# Patient Record
Sex: FEMALE | Race: WHITE | NOT HISPANIC OR LATINO | Employment: STUDENT | URBAN - METROPOLITAN AREA
[De-identification: names, ages, dates, MRNs, and addresses within clinical notes are randomized per-mention and may not be internally consistent; named-entity substitution may affect disease eponyms.]

---
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
## 2018-08-01 ENCOUNTER — TELEPHONE (OUTPATIENT)
Dept: NEUROLOGY | Facility: CLINIC | Age: 18
End: 2018-08-01

## 2018-08-01 NOTE — TELEPHONE ENCOUNTER
I returned patients mothers phone call as requested  difficult to understand her  States that PCP sent referral 2 days ago for Neurology, I explained nothing in the system as of yet  She said they sent 2x already  I requested PCP #, she gave me daughters Nephorlogist Dr Moran Comment?? [phone]  I searched through chart and cannot fine any referral or consult for Neurology, ED note of 7/4/18 states pt  Seen for syncope    [place] [facility] no f/up instructions on d/c summary  I did call Dr [facility] office and spoke to his nurse, Jacobo Cruz  She stated that they did fax over 2x  I explained that it does go through [employer]  I will wait  Another day or two and check again  If still not in system, I will call and give another fax# to use  Sofía Del Rio agreeable

## 2018-08-02 ENCOUNTER — TRANSCRIBE ORDERS (OUTPATIENT)
Dept: NEUROLOGY | Facility: CLINIC | Age: 18
End: 2018-08-02

## 2018-08-02 ENCOUNTER — TELEPHONE (OUTPATIENT)
Dept: NEUROLOGY | Facility: CLINIC | Age: 18
End: 2018-08-02

## 2018-08-02 DIAGNOSIS — Z87.898 HX OF SYNCOPE: Primary | ICD-10-CM

## 2018-08-02 NOTE — TELEPHONE ENCOUNTER
LM[facility] letting patient know we receive order for Syncope  Please schedule an appointment with Dr Jackelyn White for 09/19/18 for 12:00pm if patient is interested   Or transfer call

## 2019-04-12 ENCOUNTER — TELEPHONE (OUTPATIENT)
Dept: NON INVASIVE DIAGNOSTICS | Facility: HOSPITAL | Age: 19
End: 2019-04-12

## 2019-05-15 ENCOUNTER — HOSPITAL ENCOUNTER (OUTPATIENT)
Dept: NON INVASIVE DIAGNOSTICS | Facility: HOSPITAL | Age: 19
Discharge: HOME | End: 2019-05-15
Payer: COMMERCIAL

## 2019-05-15 DIAGNOSIS — R55 SYNCOPE, UNSPECIFIED SYNCOPE TYPE: ICD-10-CM

## 2019-05-15 PROCEDURE — 93660 TILT TABLE EVALUATION: CPT

## 2019-06-02 PROCEDURE — 93660 TILT TABLE EVALUATION: CPT | Performed by: INTERNAL MEDICINE

## 2019-07-04 ENCOUNTER — HOSPITAL ENCOUNTER (EMERGENCY)
Facility: HOSPITAL | Age: 19
Discharge: HOME | End: 2019-07-04
Attending: EMERGENCY MEDICINE
Payer: COMMERCIAL

## 2019-07-04 VITALS
DIASTOLIC BLOOD PRESSURE: 54 MMHG | TEMPERATURE: 98.3 F | HEIGHT: 62 IN | OXYGEN SATURATION: 98 % | HEART RATE: 83 BPM | BODY MASS INDEX: 28.93 KG/M2 | RESPIRATION RATE: 16 BRPM | WEIGHT: 157.19 LBS | SYSTOLIC BLOOD PRESSURE: 115 MMHG

## 2019-07-04 DIAGNOSIS — Z76.0 MEDICATION REFILL: Primary | ICD-10-CM

## 2019-07-04 LAB
EXT PREG TEST URINE: NEGATIVE
EXT. CONTROL ED NAV: NORMAL

## 2019-07-04 PROCEDURE — 81025 URINE PREGNANCY TEST: CPT | Performed by: EMERGENCY MEDICINE

## 2019-07-04 PROCEDURE — 99283 EMERGENCY DEPT VISIT LOW MDM: CPT | Performed by: EMERGENCY MEDICINE

## 2019-07-04 PROCEDURE — 99282 EMERGENCY DEPT VISIT SF MDM: CPT

## 2019-07-04 RX ORDER — LEVONORGESTREL AND ETHINYL ESTRADIOL 0.1-0.02MG
1 KIT ORAL DAILY
Qty: 28 TABLET | Refills: 0 | Status: SHIPPED | OUTPATIENT
Start: 2019-07-04

## 2019-07-04 RX ORDER — LEVONORGESTREL AND ETHINYL ESTRADIOL 0.1-0.02MG
1 KIT ORAL DAILY
COMMUNITY
End: 2019-08-05

## 2019-07-04 NOTE — ED PROVIDER NOTES
History  Chief Complaint   Patient presents with    Medication Refill     pt presents for refill of birth control sronyx      HPI  47-year-old female with history of anxiety and OCD presents to the emergency department requesting medication refill  Patient states that she finished her last pack of birth control, Sronyx, on 6/30  Medication was previously prescribed by medical staff at her school, but she recently graduated  She does plan on seeing her PCP later this month and will request PCP to prescribe her birth control  Prior to Admission Medications   Prescriptions Last Dose Informant Patient Reported? Taking?   levonorgestrel-ethinyl estradiol (AVIANE,ALESSE,LESSINA) 0 1-20 MG-MCG per tablet   Yes Yes   Sig: Take 1 tablet by mouth daily      Facility-Administered Medications: None       Past Medical History:   Diagnosis Date    Anxiety     OCD (obsessive compulsive disorder)     Psychiatric disorder        History reviewed  No pertinent surgical history  History reviewed  No pertinent family history  I have reviewed and agree with the history as documented  Social History     Tobacco Use    Smoking status: Never Smoker   Substance Use Topics    Alcohol use: No    Drug use: No        Review of Systems   All other systems reviewed and are negative  Physical Exam  Physical Exam   Constitutional: She is oriented to person, place, and time  She appears well-nourished  No distress  HENT:   Head: Normocephalic and atraumatic  Eyes: EOM are normal    Neck: Normal range of motion  Neck supple  Cardiovascular: Normal rate and regular rhythm  Pulmonary/Chest: No respiratory distress  Abdominal: She exhibits no distension  Musculoskeletal: Normal range of motion  Neurological: She is alert and oriented to person, place, and time  Skin: Skin is warm and dry  She is not diaphoretic  Psychiatric: She has a normal mood and affect   Her behavior is normal    Nursing note and vitals reviewed  Vital Signs  ED Triage Vitals   Temperature Pulse Respirations Blood Pressure SpO2   07/04/19 1147 07/04/19 1145 07/04/19 1145 07/04/19 1147 07/04/19 1145   98 3 °F (36 8 °C) 83 16 115/54 98 %      Temp Source Heart Rate Source Patient Position - Orthostatic VS BP Location FiO2 (%)   07/04/19 1147 07/04/19 1145 07/04/19 1147 07/04/19 1147 --   Oral Monitor Sitting Right arm       Pain Score       07/04/19 1145       No Pain           Vitals:    07/04/19 1145 07/04/19 1147   BP:  115/54   Pulse: 83    Patient Position - Orthostatic VS:  Sitting         Visual Acuity      ED Medications  Medications - No data to display    Diagnostic Studies  Results Reviewed     Procedure Component Value Units Date/Time    POCT pregnancy, urine [[id number]]  (Normal) Resulted:  07/04/19 1157    Lab Status:  Final result Updated:  07/04/19 1201     EXT PREG TEST UR (Ref: Negative) negative     Control valid                 No orders to display              Procedures  Procedures       ED Course                               MDM    Disposition  Final diagnoses:   Medication refill     Time reflects when diagnosis was documented in both MDM as applicable and the Disposition within this note     Time User Action Codes Description Comment    7/4/2019 11:54 AM Elise Reed Add [Z76 0] Medication refill       ED Disposition     ED Disposition Condition Date/Time Comment    Discharge Stable u Jul 4, 2019 11:54 AM Pita Barajas discharge to home/self care  Follow-up Information     Follow up With Specialties Details Why Contact Info Additional Information    Edgar Oden MD   As needed [address] [facility] [id number] [facility]  Obstetrics and Gynecology Schedule an appointment as soon as possible for a visit  As needed [address] [place] [address] Jayna Balbuena [address], Rosanna Payne Ii [address], [address]          Discharge Medication List as of 7/4/2019 11:56 AM      START taking these medications    Details   !! levonorgestrel-ethinyl estradiol (AVIANE,ALESSE,LESSINA) 0 1-20 MG-MCG per tablet Take 1 tablet by mouth daily, Starting Thu 7/4/2019, Print       !! - Potential duplicate medications found  Please discuss with provider  CONTINUE these medications which have NOT CHANGED    Details   !! levonorgestrel-ethinyl estradiol (AVIANE,ALESSE,LESSINA) 0 1-20 MG-MCG per tablet Take 1 tablet by mouth daily, Historical Med       !! - Potential duplicate medications found  Please discuss with provider  No discharge procedures on file      ED Provider  Electronically Signed by           Reji Barreto MD  07/07/19 4933

## 2019-08-05 ENCOUNTER — HOSPITAL ENCOUNTER (EMERGENCY)
Facility: HOSPITAL | Age: 19
Discharge: HOME | End: 2019-08-05
Attending: EMERGENCY MEDICINE | Admitting: EMERGENCY MEDICINE
Payer: COMMERCIAL

## 2019-08-05 VITALS
TEMPERATURE: 98.8 F | OXYGEN SATURATION: 99 % | HEART RATE: 70 BPM | DIASTOLIC BLOOD PRESSURE: 69 MMHG | RESPIRATION RATE: 16 BRPM | SYSTOLIC BLOOD PRESSURE: 129 MMHG

## 2019-08-05 DIAGNOSIS — R55 SYNCOPE: Primary | ICD-10-CM

## 2019-08-05 LAB
BILIRUB UR QL STRIP: NEGATIVE
CLARITY UR: CLEAR
COLOR UR: YELLOW
EXT PREG TEST URINE: NEGATIVE
EXT. CONTROL ED NAV: NORMAL
GLUCOSE UR STRIP-MCNC: NEGATIVE MG/DL
HGB UR QL STRIP.AUTO: NEGATIVE
KETONES UR STRIP-MCNC: NEGATIVE MG/DL
LEUKOCYTE ESTERASE UR QL STRIP: NEGATIVE
NITRITE UR QL STRIP: NEGATIVE
PH UR STRIP.AUTO: 7 [PH]
PROT UR STRIP-MCNC: NEGATIVE MG/DL
SP GR UR STRIP.AUTO: 1.01 (ref 1–1.03)
UROBILINOGEN UR QL STRIP.AUTO: 0.2 E.U./DL

## 2019-08-05 PROCEDURE — 81025 URINE PREGNANCY TEST: CPT | Performed by: PHYSICIAN ASSISTANT

## 2019-08-05 PROCEDURE — 99284 EMERGENCY DEPT VISIT MOD MDM: CPT

## 2019-08-05 PROCEDURE — 81003 URINALYSIS AUTO W/O SCOPE: CPT | Performed by: PHYSICIAN ASSISTANT

## 2019-08-05 PROCEDURE — 99283 EMERGENCY DEPT VISIT LOW MDM: CPT | Performed by: EMERGENCY MEDICINE

## 2019-08-05 PROCEDURE — 93005 ELECTROCARDIOGRAM TRACING: CPT

## 2019-08-05 NOTE — ED PROVIDER NOTES
History  Chief Complaint   Patient presents with    Syncope     pt fainted during karate class, pt has hx of palpitations and syncope and is under cardiology care     77-year-old female patient here for evaluation of palpitations  This occurred about 1 hours ago  She was in a karate class when she began to feel lightheaded and dizzy  She felt her heart racing and she had to sit down before passing out  She passed out for a few seconds to about 1 minutes  Afterwards she felt off but is now improved and is back to baseline  This is a recurrent issue for her and is no different from prior episodes  She has had extensive workup including cardiology evaluation with both Holter monitoring and echocardiogram   No cause has been found  Denies any chest pain nausea vomiting lightheadedness or dizziness at this time  No headache  No visual disturbances during this whole event  No head injury or trauma  History provided by:  Patient   used: No    Syncope   Episode history:  Single  Most recent episode: Today  Duration:  1 minute  Timing:  Constant  Progression:  Resolved  Chronicity:  Recurrent  Context: not blood draw, not bowel movement, not dehydration, not exertion, not inactivity, not medication change, not with normal activity, not sight of blood, not sitting down, not standing up and not urination    Witnessed: yes    Relieved by:  Nothing  Worsened by:  Nothing  Ineffective treatments:  None tried  Associated symptoms: no chest pain, no diaphoresis, no dizziness, no fever, no headaches, no nausea, no palpitations, no shortness of breath, no vomiting and no weakness    Risk factors: no congenital heart disease, no coronary artery disease, no seizures and no vascular disease        Prior to Admission Medications   Prescriptions Last Dose Informant Patient Reported?  Taking?   levonorgestrel-ethinyl estradiol (AVIANE,ALESSE,LESSINA) 0 1-20 MG-MCG per tablet   No No   Sig: Take 1 tablet by mouth daily      Facility-Administered Medications: None       Past Medical History:   Diagnosis Date    Anxiety     OCD (obsessive compulsive disorder)     Psychiatric disorder        History reviewed  No pertinent surgical history  History reviewed  No pertinent family history  I have reviewed and agree with the history as documented  Social History     Tobacco Use    Smoking status: Never Smoker    Smokeless tobacco: Never Used   Substance Use Topics    Alcohol use: No    Drug use: No        Review of Systems   Constitutional: Negative for activity change, appetite change, chills, diaphoresis, fatigue, fever and unexpected weight change  HENT: Negative for congestion, rhinorrhea, sinus pressure, sore throat and trouble swallowing  Eyes: Negative for photophobia and visual disturbance  Respiratory: Negative for apnea, cough, choking, chest tightness, shortness of breath, wheezing and stridor  Cardiovascular: Positive for syncope  Negative for chest pain, palpitations and leg swelling  Gastrointestinal: Negative for abdominal distention, abdominal pain, blood in stool, constipation, diarrhea, nausea and vomiting  Genitourinary: Negative for decreased urine volume, difficulty urinating, dysuria, enuresis, flank pain, frequency, hematuria and urgency  Musculoskeletal: Negative for arthralgias, myalgias, neck pain and neck stiffness  Skin: Negative for color change, pallor, rash and wound  Allergic/Immunologic: Negative  Neurological: Positive for syncope and light-headedness (Presyncope)  Negative for dizziness, tremors, weakness, numbness and headaches  Hematological: Negative  Psychiatric/Behavioral: Negative  All other systems reviewed and are negative  Physical Exam  Physical Exam   Constitutional: She is oriented to person, place, and time  She appears well-developed and well-nourished  Non-toxic appearance  She does not have a sickly appearance   She does not appear ill  No distress  HENT:   Head: Normocephalic and atraumatic  Eyes: Pupils are equal, round, and reactive to light  EOM and lids are normal    Neck: Normal range of motion  Neck supple  Cardiovascular: Normal rate, regular rhythm, S1 normal, S2 normal, normal heart sounds, intact distal pulses and normal pulses  Exam reveals no gallop, no distant heart sounds, no friction rub and no decreased pulses  No murmur heard  Pulses:       Radial pulses are 2+ on the right side, and 2+ on the left side  Pulmonary/Chest: Effort normal and breath sounds normal  No accessory muscle usage  No apnea, no tachypnea and no bradypnea  No respiratory distress  She has no decreased breath sounds  She has no wheezes  She has no rhonchi  She has no rales  Abdominal: Soft  Normal appearance  She exhibits no distension  There is no tenderness  There is no rigidity, no rebound and no guarding  Musculoskeletal: Normal range of motion  She exhibits no edema, tenderness or deformity  Neurological: She is alert and oriented to person, place, and time  No cranial nerve deficit  GCS eye subscore is 4  GCS verbal subscore is 5  GCS motor subscore is 6  GCS 15  AAOx3  No focal neuro deficits  CN II-XII grossly intact  Speech normal, no aphasia or dysarthria  No pronator drift  Cerebellar function normal  Finger to nose normal  PERRL  EOMI  Peripheral vision intact  No nystagmus  Upper and lower extremity strength 5/5 through   strength 5/5 b/l  Gross sensation intact b/l  Skin: Skin is warm, dry and intact  No rash noted  She is not diaphoretic  No erythema  No pallor  Psychiatric: Her speech is normal    Nursing note and vitals reviewed        Vital Signs  ED Triage Vitals [08/05/19 1851]   Temperature Pulse Respirations Blood Pressure SpO2   98 8 °F (37 1 °C) 73 15 137/64 98 %      Temp Source Heart Rate Source Patient Position - Orthostatic VS BP Location FiO2 (%)   Oral Monitor Lying Right arm -- Pain Score       --           Vitals:    08/05/19 1851 08/05/19 2021   BP: 137/64 129/69   Pulse: 73 70   Patient Position - Orthostatic VS: Lying          Visual Acuity      ED Medications  Medications - No data to display    Diagnostic Studies  Results Reviewed     Procedure Component Value Units Date/Time    UA w Reflex to Microscopic w Reflex to Culture [[id number]] Collected:  08/05/19 2000    Lab Status:  Final result Specimen:  Urine Updated:  08/05/19 2005     Color, UA Yellow     Clarity, UA Clear     Specific Gravity, UA 1 015     pH, UA 7 0     Leukocytes, UA Negative     Nitrite, UA Negative     Protein, UA Negative mg/dl      Glucose, UA Negative mg/dl      Ketones, UA Negative mg/dl      Urobilinogen, UA 0 2 E U /dl      Bilirubin, UA Negative     Blood, UA Negative    POCT pregnancy, urine [[id number]]  (Normal) Resulted:  08/05/19 1953    Lab Status:  Final result Updated:  08/05/19 1954     EXT PREG TEST UR (Ref: Negative) negative     Control v                 No orders to display              Procedures  Procedures       ED Course                               MDM  Number of Diagnoses or Management Options  Syncope: new and requires workup  Diagnosis management comments: Differential diagnosis including but not limited to: vasovagal syncope, cardiac arrhythmia, MI, ACS, PE, metabolic abnormality, intracranial process, seizure, anemia, GI bleed, dehydration, ectopic pregnancy  Plan:  Urinalysis, pregnancy, labs  Dispo pending  Amount and/or Complexity of Data Reviewed  Clinical lab tests: ordered and reviewed    Risk of Complications, Morbidity, and/or Mortality  Presenting problems: low  Management options: low  General comments: 39-year-old female with syncope  She is back to baseline  Pregnancy is negative  Offered labs which patient is refusing  She states she has had this happen before, feels fine and wants to go home at this point    We had a lengthy discussion in terms of risks and benefits  She is insistent that this is the same as last time  Will discharge home with close follow-up and return parameters  Patient understands and agrees with this plan  Patient Progress  Patient progress: stable      Disposition  Final diagnoses:   Syncope     Time reflects when diagnosis was documented in both MDM as applicable and the Disposition within this note     Time User Action Codes Description Comment    8/5/2019  8:19 PM Hector Aceves Add [R55] Syncope       ED Disposition     ED Disposition Condition Date/Time Comment    Discharge Stable Mon Aug 5, 2019  8:19 PM Ozella Homans discharge to home/self care  Follow-up Information     Follow up With Specialties Details Why Contact Info    Your cardiologist              Discharge Medication List as of 8/5/2019  8:20 PM      CONTINUE these medications which have NOT CHANGED    Details   levonorgestrel-ethinyl estradiol (AVIANE,LILY,LESSINA) 0 1-20 MG-MCG per tablet Take 1 tablet by mouth daily, Starting Thu 7/4/2019, Print           No discharge procedures on file      ED Provider  Electronically Signed by           Devika Lopez PA-C  08/13/19 0409

## 2019-08-06 LAB
ATRIAL RATE: 66 BPM
P AXIS: 10 DEGREES
PR INTERVAL: 154 MS
QRS AXIS: 105 DEGREES
QRSD INTERVAL: 96 MS
QT INTERVAL: 402 MS
QTC INTERVAL: 421 MS
T WAVE AXIS: -2 DEGREES
VENTRICULAR RATE: 66 BPM

## 2019-08-06 PROCEDURE — 93010 ELECTROCARDIOGRAM REPORT: CPT | Performed by: INTERNAL MEDICINE

## 2021-02-17 ENCOUNTER — HOSPITAL ENCOUNTER (EMERGENCY)
Facility: HOSPITAL | Age: 21
Discharge: HOME | End: 2021-02-17
Attending: EMERGENCY MEDICINE
Payer: COMMERCIAL

## 2021-02-17 VITALS
HEIGHT: 61 IN | RESPIRATION RATE: 20 BRPM | DIASTOLIC BLOOD PRESSURE: 60 MMHG | HEART RATE: 90 BPM | WEIGHT: 155 LBS | OXYGEN SATURATION: 98 % | SYSTOLIC BLOOD PRESSURE: 133 MMHG | BODY MASS INDEX: 29.27 KG/M2 | TEMPERATURE: 97.7 F

## 2021-02-17 DIAGNOSIS — R55 SYNCOPE, UNSPECIFIED SYNCOPE TYPE: Primary | ICD-10-CM

## 2021-02-17 LAB
ALBUMIN SERPL-MCNC: 4.5 G/DL (ref 3.4–5)
ALP SERPL-CCNC: 43 IU/L (ref 35–126)
ALT SERPL-CCNC: 19 IU/L (ref 11–54)
ANION GAP SERPL CALC-SCNC: 8 MEQ/L (ref 3–15)
AST SERPL-CCNC: 17 IU/L (ref 15–41)
BACTERIA URNS QL MICRO: 1 /HPF
BASOPHILS # BLD: 0.04 K/UL (ref 0.01–0.1)
BASOPHILS NFR BLD: 0.6 %
BILIRUB SERPL-MCNC: 0.5 MG/DL (ref 0.3–1.2)
BILIRUB UR QL STRIP.AUTO: NEGATIVE MG/DL
BUN SERPL-MCNC: 12 MG/DL (ref 8–20)
CALCIUM SERPL-MCNC: 9.4 MG/DL (ref 8.9–10.3)
CHLORIDE SERPL-SCNC: 104 MEQ/L (ref 98–109)
CLARITY UR REFRACT.AUTO: CLEAR
CO2 SERPL-SCNC: 25 MEQ/L (ref 22–32)
COLOR UR AUTO: YELLOW
CREAT SERPL-MCNC: 0.7 MG/DL (ref 0.6–1.1)
DIFFERENTIAL METHOD BLD: ABNORMAL
EOSINOPHIL # BLD: 0.09 K/UL (ref 0.04–0.36)
EOSINOPHIL NFR BLD: 1.3 %
ERYTHROCYTE [DISTWIDTH] IN BLOOD BY AUTOMATED COUNT: 10.9 % (ref 11.7–14.4)
GFR SERPL CREATININE-BSD FRML MDRD: >60 ML/MIN/1.73M*2
GLUCOSE SERPL-MCNC: 91 MG/DL (ref 70–99)
GLUCOSE UR STRIP.AUTO-MCNC: NEGATIVE MG/DL
HCG UR QL: NEGATIVE
HCT VFR BLDCO AUTO: 41.4 % (ref 35–45)
HGB BLD-MCNC: 14.3 G/DL (ref 11.8–15.7)
HGB UR QL STRIP.AUTO: 3
HYALINE CASTS #/AREA URNS LPF: ABNORMAL /LPF
IMM GRANULOCYTES # BLD AUTO: 0.02 K/UL (ref 0–0.08)
IMM GRANULOCYTES NFR BLD AUTO: 0.3 %
KETONES UR STRIP.AUTO-MCNC: ABNORMAL MG/DL
LEUKOCYTE ESTERASE UR QL STRIP.AUTO: ABNORMAL
LYMPHOCYTES # BLD: 1.94 K/UL (ref 1.2–3.5)
LYMPHOCYTES NFR BLD: 28.9 %
MCH RBC QN AUTO: 32.8 PG (ref 28–33.2)
MCHC RBC AUTO-ENTMCNC: 34.5 G/DL (ref 32.2–35.5)
MCV RBC AUTO: 95 FL (ref 83–98)
MONOCYTES # BLD: 0.5 K/UL (ref 0.28–0.8)
MONOCYTES NFR BLD: 7.5 %
NEUTROPHILS # BLD: 4.12 K/UL (ref 1.7–7)
NEUTS SEG NFR BLD: 61.4 %
NITRITE UR QL STRIP.AUTO: NEGATIVE
NRBC BLD-RTO: 0 %
PDW BLD AUTO: 9.3 FL (ref 9.4–12.3)
PH UR STRIP.AUTO: 7.5 [PH]
PLATELET # BLD AUTO: 258 K/UL (ref 150–369)
POTASSIUM SERPL-SCNC: 3.6 MEQ/L (ref 3.6–5.1)
PROT SERPL-MCNC: 7.8 G/DL (ref 6–8.2)
PROT UR QL STRIP.AUTO: NEGATIVE
RBC # BLD AUTO: 4.36 M/UL (ref 3.93–5.22)
RBC #/AREA URNS HPF: ABNORMAL /HPF
SODIUM SERPL-SCNC: 137 MEQ/L (ref 136–144)
SP GR UR REFRACT.AUTO: 1.01
SQUAMOUS URNS QL MICRO: 1 /HPF
TROPONIN I SERPL-MCNC: <0.02 NG/ML
UROBILINOGEN UR STRIP-ACNC: 0.2 EU/DL
WBC # BLD AUTO: 6.71 K/UL (ref 3.8–10.5)
WBC #/AREA URNS HPF: ABNORMAL /HPF

## 2021-02-17 PROCEDURE — 99284 EMERGENCY DEPT VISIT MOD MDM: CPT | Mod: 25

## 2021-02-17 PROCEDURE — 25800000 HC PHARMACY IV SOLUTIONS: Performed by: PHYSICIAN ASSISTANT

## 2021-02-17 PROCEDURE — 84484 ASSAY OF TROPONIN QUANT: CPT

## 2021-02-17 PROCEDURE — 80053 COMPREHEN METABOLIC PANEL: CPT | Performed by: EMERGENCY MEDICINE

## 2021-02-17 PROCEDURE — 81001 URINALYSIS AUTO W/SCOPE: CPT | Performed by: PHYSICIAN ASSISTANT

## 2021-02-17 PROCEDURE — 3E0337Z INTRODUCTION OF ELECTROLYTIC AND WATER BALANCE SUBSTANCE INTO PERIPHERAL VEIN, PERCUTANEOUS APPROACH: ICD-10-PCS | Performed by: EMERGENCY MEDICINE

## 2021-02-17 PROCEDURE — 87086 URINE CULTURE/COLONY COUNT: CPT | Performed by: PHYSICIAN ASSISTANT

## 2021-02-17 PROCEDURE — 96360 HYDRATION IV INFUSION INIT: CPT

## 2021-02-17 PROCEDURE — 93005 ELECTROCARDIOGRAM TRACING: CPT | Performed by: EMERGENCY MEDICINE

## 2021-02-17 PROCEDURE — 84703 CHORIONIC GONADOTROPIN ASSAY: CPT

## 2021-02-17 PROCEDURE — 36415 COLL VENOUS BLD VENIPUNCTURE: CPT | Performed by: EMERGENCY MEDICINE

## 2021-02-17 PROCEDURE — 85025 COMPLETE CBC W/AUTO DIFF WBC: CPT | Performed by: EMERGENCY MEDICINE

## 2021-02-17 RX ADMIN — SODIUM CHLORIDE 1000 ML: 9 INJECTION, SOLUTION INTRAVENOUS at 13:56

## 2021-02-17 NOTE — Clinical Note
Chyna Anderson was seen and treated in our emergency department on 2/17/2021.  She may return to work on 02/19/2021.       If you have any questions or concerns, please don't hesitate to call.      Jessy Nielsen PA C

## 2021-02-17 NOTE — ED ATTESTATION NOTE
The patient was evaluated and managed by the physician assistant / nurse practitioner.     iLz Morgan MD  02/17/21 4490

## 2021-02-18 LAB
ATRIAL RATE: 95
BACTERIA UR CULT: NORMAL
P AXIS: 55
PR INTERVAL: 172
QRS DURATION: 98
QT INTERVAL: 364
QTC CALCULATION(BAZETT): 457
R AXIS: 101
T WAVE AXIS: -13
VENTRICULAR RATE: 95

## 2021-02-18 NOTE — ED PROVIDER NOTES
Emergency Medicine Note  HPI   HISTORY OF PRESENT ILLNESS     HPI      Patient History   PAST HISTORY     Reviewed from Nursing Triage:      Past Medical History:   Diagnosis Date   • Syncope        History reviewed. No pertinent surgical history.    History reviewed. No pertinent family history.    Social History     Tobacco Use   • Smoking status: Never Smoker   • Smokeless tobacco: Never Used   Substance Use Topics   • Alcohol use: Yes   • Drug use: Never         Review of Systems   REVIEW OF SYSTEMS     Review of Systems      VITALS     ED Vitals    Date/Time Temp Pulse Resp BP SpO2 [facility]   02/17/21 1520 -- 90 20 133/60 98 % ALB   02/17/21 1321 36.5 °C (97.7 °F) -- 18 132/70 94 % LC                       Physical Exam   PHYSICAL EXAM     Physical Exam      PROCEDURES     Procedures     DATA     Results     Procedure Component Value Units Date/Time    [place] Draw Panel [[id number]] Collected: 02/17/21 1339    Specimen: Blood, Venous Updated: 02/17/21 2200    Narrative:      The following orders were created for panel order [place] Draw Panel.  Procedure                               Abnormality         Status                     ---------                               -----------         ------                     RAINBOW RED[[id number]]                                      Final result               RAINBOW LAVENDER[[id number]]                                                            RAINBOW LT BLUE[[id number]]                                  Final result               RAINBOW GOLD[[id number]]                                     Final result                 Please view results for these tests on the individual orders.    RAINBOW RED [[id number]] Collected: 02/17/21 1339    Specimen: Blood, Venous Updated: 02/17/21 2200    RAINBOW LT BLUE [[id number]] Collected: 02/17/21 1339    Specimen: Blood, Venous Updated: 02/17/21 2200    RAINBOW GOLD [[id number]] Collected: 02/17/21 1339    Specimen: Blood, Venous Updated:  02/17/21 2200    Urinalysis with Reflex Culture (ED and Outpatient only) [[id number]]  (Abnormal) Collected: 02/17/21 1437    Specimen: Urine, Clean Catch Updated: 02/17/21 1454    Narrative:      The following orders were created for panel order Urinalysis with Reflex Culture (ED and Outpatient only).  Procedure                               Abnormality         Status                     ---------                               -----------         ------                     UA Reflex to Culture (Ma...[[id number]]  Abnormal            Final result               UA Microscopic[[id number]]               Abnormal            Final result                 Please view results for these tests on the individual orders.    UA Microscopic [[id number]]  (Abnormal) Collected: 02/17/21 1437    Specimen: Urine, Clean Catch Updated: 02/17/21 1454     RBC, Urine 0 TO 4 /HPF      WBC, Urine 0 TO 3 /HPF      Squamous Epithelial +1 /hpf      Hyaline Cast None Seen /lpf      Bacteria, Urine +1 /HPF     UA Reflex to Culture (Macroscopic) [[id number]]  (Abnormal) Collected: 02/17/21 1437    Specimen: Urine, Clean Catch Updated: 02/17/21 1452     Color, Urine Yellow     Clarity, Urine Clear     Specific Gravity, Urine 1.006     pH, Urine 7.5     Leukocyte Esterase Trace     Nitrite, Urine Negative     Protein, Urine Negative     Glucose, Urine Negative mg/dL      Ketones, Urine Trace mg/dL      Comment: Free sulfhydryl drugs such as Mesna, Capoten, and Acetylcysteine (Mucomyst) may cause false positive ketonuria.        Urobilinogen, Urine 0.2 EU/dL      Bilirubin, Urine Negative mg/dL      Blood, Urine +3     Comment: The sensitivity of the occult blood test is equivalent to approximately 4 intact RBC/HPF.       Comprehensive metabolic panel [[id number]]  (Normal) Collected: 02/17/21 1339    Specimen: Blood, Venous Updated: 02/17/21 1411     Sodium 137 mEQ/L      Potassium 3.6 mEQ/L      Comment: Results obtained on plasma. Plasma Potassium  values may be up to 0.4 mEQ/L less than serum values. The differences may be greater for patients with high platelet or white cell counts.        Chloride 104 mEQ/L      CO2 25 mEQ/L      BUN 12 mg/dL      Creatinine 0.7 mg/dL      Glucose 91 mg/dL      Calcium 9.4 mg/dL      AST (SGOT) 17 IU/L      ALT (SGPT) 19 IU/L      Alkaline Phosphatase 43 IU/L      Total Protein 7.8 g/dL      Comment: Test performed on plasma which typically contains approximately 0.4 g/dL more protein than serum.        Albumin 4.5 g/dL      Bilirubin, Total 0.5 mg/dL      eGFR >60.0 mL/min/1.73m*2      Anion Gap 8 mEQ/L     Troponin I [[id number]]  (Normal) Collected: 02/17/21 1339    Specimen: Blood, Venous Updated: 02/17/21 1411     Troponin I <0.02 ng/mL     hCG, serum, qualitative [[id number]]  (Normal) Collected: 02/17/21 1339    Specimen: Blood, Venous Updated: 02/17/21 1404     Preg Test, Serum Negative    CBC and differential [[id number]]  (Abnormal) Collected: 02/17/21 1339    Specimen: Blood, Venous Updated: 02/17/21 1351     WBC 6.71 K/uL      RBC 4.36 M/uL      Hemoglobin 14.3 g/dL      Hematocrit 41.4 %      MCV 95.0 fL      MCH 32.8 pg      MCHC 34.5 g/dL      RDW 10.9 %      Platelets 258 K/uL      MPV 9.3 fL      Differential Type Auto     nRBC 0.0 %      Immature Granulocytes 0.3 %      Neutrophils 61.4 %      Lymphocytes 28.9 %      Monocytes 7.5 %      Eosinophils 1.3 %      Basophils 0.6 %      Immature Granulocytes, Absolute 0.02 K/uL      Neutrophils, Absolute 4.12 K/uL      Lymphocytes, Absolute 1.94 K/uL      Monocytes, Absolute 0.50 K/uL      Eosinophils, Absolute 0.09 K/uL      Basophils, Absolute 0.04 K/uL           Imaging Results    None         ECG 12 lead    (Results Pending)       Scoring tools                               ED Course & MDM   MDM / ED COURSE and CLINICAL IMPRESSIONS     MDM    ED Course as of Feb 18 0029   Wed Feb 17, 2021   1445 EKG interpretation, normal sinus rhythm, inferior T wave  abnormality (patient is aware and states this is her baseline) 95 bpm normal intervals normal QRS    [SC]      ED Course User Index  [SC] Jessy Nielsen PA C         Clinical Impressions as of Feb 18 0029   Syncope, unspecified syncope type            Jessy Nielsen PA C  02/18/21 0029

## 2021-11-29 ENCOUNTER — OFFICE VISIT (OUTPATIENT)
Dept: OBSTETRICS AND GYNECOLOGY | Facility: CLINIC | Age: 21
End: 2021-11-29
Payer: COMMERCIAL

## 2021-11-29 VITALS
SYSTOLIC BLOOD PRESSURE: 127 MMHG | WEIGHT: 147.4 LBS | HEIGHT: 61 IN | BODY MASS INDEX: 27.83 KG/M2 | DIASTOLIC BLOOD PRESSURE: 64 MMHG

## 2021-11-29 DIAGNOSIS — Z01.419 WELL WOMAN EXAM WITH ROUTINE GYNECOLOGICAL EXAM: ICD-10-CM

## 2021-11-29 DIAGNOSIS — Z11.3 ROUTINE SCREENING FOR STI (SEXUALLY TRANSMITTED INFECTION): Primary | ICD-10-CM

## 2021-11-29 PROBLEM — R07.9 CHEST PAIN: Status: ACTIVE | Noted: 2021-11-29

## 2021-11-29 PROBLEM — G43.009 MIGRAINE WITHOUT AURA AND WITHOUT STATUS MIGRAINOSUS, NOT INTRACTABLE: Status: ACTIVE | Noted: 2018-10-15

## 2021-11-29 PROBLEM — R55 RECURRENT SYNCOPE: Status: ACTIVE | Noted: 2019-03-25

## 2021-11-29 PROBLEM — F41.9 ANXIETY: Status: ACTIVE | Noted: 2021-11-29

## 2021-11-29 PROBLEM — R55 VASOVAGAL SYNCOPE: Status: ACTIVE | Noted: 2018-10-15

## 2021-11-29 PROCEDURE — 3008F BODY MASS INDEX DOCD: CPT | Performed by: OBSTETRICS & GYNECOLOGY

## 2021-11-29 PROCEDURE — 99385 PREV VISIT NEW AGE 18-39: CPT | Performed by: OBSTETRICS & GYNECOLOGY

## 2021-11-29 RX ORDER — VENLAFAXINE HYDROCHLORIDE 150 MG/1
300 CAPSULE, EXTENDED RELEASE ORAL
COMMUNITY
Start: 2021-08-22 | End: 2023-01-20 | Stop reason: SDUPTHER

## 2021-11-29 NOTE — PROGRESS NOTES
Visit Date: 11/29/2021   Chyna Anderson is 21 y.o. female presenting today for Annual GYN Exam    HPI:  Last Menstrual Period: Patient's last menstrual period was 11/15/2021.  Menstrual Cycle: Menstrual cycle is Irregular. Approximate interval of 75 days. Bleeding lasts 5 days. Bleeding is Heavy then tapers. Patient has no bleeding <21 days. Patient does not have painful cramping during her periods. Never goes more than 3 months without a period, this has been her pattern most of her life. Has been regular at times when she was on OCPs but stopped due to insurance issues.  Sexually Activity: Patient is sexually active without difficulty.  Contraception: Patient is using condoms for contraception and  is not happy with method.    Bowel and Bladder function: Normal per patient.  Pap Smears: does not have a history of abnormal pap smears, Last pap was in 2018 and was neg per patient.   Patient  does not report any breast issues.  Patient does not have a family history of breast or gyn cancers.  Patient  does desire screening for STIs today.   Patient does not have a history of Domestic Violence/Verbal Abuse/Sexual Assault. She does feel safe in her current environment.     Past Medical History:  has a past medical history of Anxiety, Cervical cancer screening (2018), Chlamydia, Depression, and Syncope.    Past Surgical History:  has no past surgical history on file.    Medications:   Current Outpatient Medications:   •  venlafaxine  mg 24 hr capsule, 300 mg.  , Disp: , Rfl:       Allergies: has No Known Allergies.     Family History: family history includes Cancer in her biological father, maternal grandfather, and maternal grandmother; Colon cancer in her paternal grandmother; Diabetes in her biological father, biological mother, maternal grandfather, maternal grandmother, paternal grandfather, and paternal grandmother; Hypertension in her maternal grandmother.    Social History:  reports that she has never  "smoked. She has never used smokeless tobacco. She reports current alcohol use of about 2.0 standard drinks of alcohol per week. She reports that she does not use drugs.    Review of Systems  Constitutional:   No hot flashes.   No night sweats.   No unexpected weight changes.  HENT:   No oral ulcers.   No headaches related to menstrual cycle.   Breasts:   No changes to skin of the breast or nipple.   No nipple discharge.   No breast lumps/cysts.   No breast pain.   Patient has not noticed any changes to breasts.   Respiratory:   No shortness of breath.  Cardiovascular:   No chest pain  Gastrointestinal:   No changes in bowel habits.  Genitourinary:   No pain with urination.   No urgency with urination.   No increased frequency of urination.   No urinary incontinence.   + vaginal discharge.   No painful intercourse.   No genital sores.   + irregular menstrual cycles.   + heavy menstrual cycles.   No painful menstrual periods.   No bleeding between menstrual cycles.   No bleeding after intercourse.  + absence of menstrual periods.  Integument:   No changes to any existing genital skin lesions or moles.   No new vaginal skin lesions or moles.   No genital rashes.   Endocrine:   No increased hair growth   Psychiatric:   + anxiety.   + depression.   No suicidal ideation.   Heme-Lymph:   No easy bruising.   No unexplained lumps.        Visit Vitals  /64   Ht 1.549 m (5' 1\")   Wt 66.9 kg (147 lb 6.4 oz)   LMP 11/15/2021   BMI 27.85 kg/m²       OBGyn Exam  General Appearance: Alert, cooperative, no acute distress  Head: Normocephalic, without obvious abnormality  Breast: Breasts: breasts appear normal, no suspicious masses, no skin or nipple changes or axillary nodes.  Abdomen: Soft, nontender, nondistended,no masses, no organomegaly  Pelvic exam: VULVA: normal appearing vulva with no masses, tenderness or lesions. VAGINA: normal appearing vagina with normal color and discharge, no lesions. CERVIX: normal appearing " cervix without discharge or lesions. UTERUS: uterus is normal size, shape, consistency and non-tender. ADNEXA: normal adnexa in size, nontender and no masses.    Assessment and Plan:  21 y.o. yo presents for an annual exam.   - Pap  with HPV collected today.  - Contraception: Patient is unhappy with current contraception. Patient was counseled on options for contraception, including short term options as well as LARCs. Questions were answered about the various methods, and we reviewed efficacy with perfect vs typical use. She would like to start Nexplanon. She will call her insurance company to check coverage then schedule a placement visit.  - STI Testing: GC/CT/Trich collected. HIV/RPR/hepatitis ordered to be drawn in lab.  - Return to office 1 year or prn  - Patient to check MyChart for results in 7-10 days. She is aware to call the office with any questions.    Arlyn Romero, DO

## 2021-11-30 LAB
C TRACH RRNA SPEC QL NAA+PROBE: NEGATIVE
HBV SURFACE AG SERPL QL IA: NEGATIVE
HIV 1+2 AB+HIV1 P24 AG SERPL QL IA: NON REACTIVE
N GONORRHOEA RRNA SPEC QL NAA+PROBE: NEGATIVE
T VAGINALIS DNA SPEC QL NAA+PROBE: NEGATIVE
TREPONEMA PALLIDUM IGG+IGM AB [PRESENCE] IN SERUM OR PLASMA BY IMMUNOASSAY: NON REACTIVE

## 2021-12-01 LAB
CYTOLOGIST CVX/VAG CYTO: NORMAL
CYTOLOGY CVX/VAG DOC CYTO: NORMAL
CYTOLOGY CVX/VAG DOC THIN PREP: NORMAL
DX ICD CODE: NORMAL
LAB CORP NOTE:: NORMAL
OTHER STN SPEC: NORMAL
STAT OF ADQ CVX/VAG CYTO-IMP: NORMAL

## 2021-12-13 ENCOUNTER — PROCEDURE VISIT (OUTPATIENT)
Dept: OBSTETRICS AND GYNECOLOGY | Facility: CLINIC | Age: 21
End: 2021-12-13
Payer: COMMERCIAL

## 2021-12-13 VITALS
SYSTOLIC BLOOD PRESSURE: 122 MMHG | HEIGHT: 61 IN | BODY MASS INDEX: 27.26 KG/M2 | WEIGHT: 144.4 LBS | DIASTOLIC BLOOD PRESSURE: 70 MMHG

## 2021-12-13 DIAGNOSIS — Z30.017 ENCOUNTER FOR INITIAL PRESCRIPTION OF NEXPLANON: Primary | ICD-10-CM

## 2021-12-13 PROCEDURE — 11981 INSERTION DRUG DLVR IMPLANT: CPT | Performed by: OBSTETRICS & GYNECOLOGY

## 2021-12-13 PROCEDURE — 99999 PR OFFICE/OUTPT VISIT,PROCEDURE ONLY: CPT | Performed by: OBSTETRICS & GYNECOLOGY

## 2021-12-13 NOTE — PROCEDURES
Remove/ Insert Drug Implant    Date/Time: 12/13/2021 8:39 AM  Performed by: Arlyn Romero DO  Authorized by: Arlyn Romero DO     Consent:      Consent obtained:  Written and verbal    Consent given by:  Patient    Procedure: implant insertion    Procedural risks discussed:  Bleeding, infection, failure rate, nerve damage and migration of device    Patient questions answered: yes      Patient agrees, verbalizes understanding, and wants to proceed: yes    Indication:     Indication: Insertion of non-biodegradable drug delivery implant    Pre-procedure:     The site was cleaned and prepped in a sterile fashion: yes      Prepped with: alcohol 70%      Local anesthetic:  Lidocaine 1%   Total amount used (mL): 2 cc    Negative urine pregnancy test: no (LMP 11/15/21, no unprotected sex since)    Procedure:     Small stab incision was made in arm: yes      Left/right:  Left    Preloaded Nexplanon trocar was placed subdermally: yes      Visualization of implant was obtained: yes      Nexplanon was inserted and trocar removed (stock supply): yes      Nexplanon was inserted and trocar removed (patient supplied): no    Palpitation confirms placement by provider and patient: yes      Site was closed with steri-strips and pressure bandage applied: yes      Estimated blood loss: minimal  Comments:      Nexplanon inserted in left arm without difficulty. Palpated after placement.

## 2021-12-13 NOTE — PROGRESS NOTES
"Patient ID: Chyna Anderson   : 2000 21 y.o.  MRN: [id number]   Visit Date: 2021    Subjective   Chyna Anderson is presenting today for Nexplanon placement        Past Medical History:  has a past medical history of Anxiety, Cervical cancer screening (2018), Chlamydia, Depression, and Syncope.     Past Surgical History:  has no past surgical history on file.    Obstetric History:   OB History    Para Term  AB Living   0 0 0 0 0 0   SAB IAB Ectopic Multiple Live Births   0 0 0 0 0       Family History: family history includes Cancer in her biological father, maternal grandfather, and maternal grandmother; Colon cancer in her paternal grandmother; Diabetes in her biological father, biological mother, maternal grandfather, maternal grandmother, paternal grandfather, and paternal grandmother; Hypertension in her maternal grandmother.    Medications:   Current Outpatient Medications:   •  venlafaxine  mg 24 hr capsule, 300 mg.  , Disp: , Rfl:       Allergies: has No Known Allergies.     All relevant medical record documentation and testing reviewed.    Vital Signs for this encounter:   Visit Vitals  /70   Ht 1.549 m (5' 1\")   Wt 65.5 kg (144 lb 6.4 oz)   LMP 11/15/2021   BMI 27.28 kg/m²       OBGyn Exam  General Appearance: Alert, cooperative, no acute distress  Head: Normocephalic, without obvious abnormality  Breast: Deferred  Abdomen: Soft, nontender, nondistended,no masses, no organomegaly  Pelvic exam: Deferred  Left arm: no anatomic abnormalities identified   Extremities: no edema    Impression/Plan:    21 y.o. is presenting today for IUD Procedure (Neplanon)    See procedure note     Arlyn Romero,   "

## 2022-03-04 ENCOUNTER — TELEPHONE (OUTPATIENT)
Dept: OBSTETRICS AND GYNECOLOGY | Facility: CLINIC | Age: 22
End: 2022-03-04
Payer: COMMERCIAL

## 2022-03-04 NOTE — TELEPHONE ENCOUNTER
Pt called had a nexplanon placed in 12/2021.  Pt has been spotting reddish brown since the placement.  Wanted to know if this was ok.  Will check with Dr. Romero and let her know.    AJAYB

## 2022-11-09 ENCOUNTER — PROCEDURE VISIT (OUTPATIENT)
Dept: OBGYN CLINIC | Facility: CLINIC | Age: 22
End: 2022-11-09

## 2022-11-09 VITALS
SYSTOLIC BLOOD PRESSURE: 118 MMHG | WEIGHT: 159 LBS | HEIGHT: 62 IN | BODY MASS INDEX: 29.26 KG/M2 | DIASTOLIC BLOOD PRESSURE: 80 MMHG

## 2022-11-09 DIAGNOSIS — N93.9 ABNORMAL UTERINE BLEEDING: ICD-10-CM

## 2022-11-09 DIAGNOSIS — Z97.5 NEXPLANON IN PLACE: Primary | ICD-10-CM

## 2022-11-09 RX ORDER — GABAPENTIN 100 MG/1
100 CAPSULE ORAL 2 TIMES DAILY
COMMUNITY
Start: 2022-11-05

## 2022-11-09 RX ORDER — HYDROXYZINE PAMOATE 25 MG/1
CAPSULE ORAL
COMMUNITY
Start: 2022-10-20

## 2022-11-09 RX ORDER — VENLAFAXINE HYDROCHLORIDE 150 MG/1
300 CAPSULE, EXTENDED RELEASE ORAL DAILY
COMMUNITY
Start: 2022-11-03

## 2022-11-09 NOTE — PROGRESS NOTES
Nexplanon removal  [facility] Selma Villasenor MD  11/09/22      Mario Alberto Kate is a 24 yo G0 with nexplanon for contraception presenting for removal secondary top AUB- placed 12/2021 by [facility]- has been having bleeding since on and off  Has trailed OCPs in past and reports she had episodes of passing out- trailed 3 different types of OCPs  Reviewed risk of pregnancy once removed- plans for condoms and is most interested in IUD  Reviewed risks of bleeding, infection, discomfort, hematoma, inability to remove  /80 (BP Location: Left arm, Patient Position: Sitting, Cuff Size: Standard)   Ht 5' 2" (1 575 m)   Wt 72 1 kg (159 lb)   LMP 10/25/2022   BMI 29 08 kg/m²     Physical Exam  Vitals and nursing note reviewed  Constitutional:       General: She is not in acute distress  Appearance: Normal appearance  She is well-developed  She is not ill-appearing or diaphoretic  HENT:      Head: Normocephalic and atraumatic  Eyes:      Conjunctiva/sclera: Conjunctivae normal    Cardiovascular:      Rate and Rhythm: Normal rate  Pulmonary:      Effort: Pulmonary effort is normal  No respiratory distress  Musculoskeletal:      Right lower leg: Edema present  Left lower leg: No edema  Skin:     General: Skin is warm and dry  Neurological:      Mental Status: She is alert and oriented to person, place, and time  Psychiatric:         Mood and Affect: Mood normal          Behavior: Behavior normal        Universal Protocol:  Procedure performed by:  Consent: Verbal consent obtained    Risks and benefits: risks, benefits and alternatives were discussed  Consent given by: patient    Remove and insert drug implant    Date/Time: 11/9/2022 2:11 PM  Performed by: Ailyn Leahy MD  Authorized by: Ailyn Leahy MD     Indication:     Indication: Presence of non-biodegradable drug delivery implant    Pre-procedure:     Prepped with: povidone-iodine      The site was cleaned and prepped in a sterile fashion: yes    Procedure:     Procedure:  Removal    Small stab incision was made in arm: yes      Left/right:  Left    Preloaded contraceptive capsule trocar was placed subdermally: no      Visualization of implant was obtained: no      Contraceptive capsule was inserted and trocar removed: no      Visualization of notch in stylet and palpation of device: no      Palpation confirms placement by provider and patient: no      Site was closed with steri-strips and pressure bandage applied: yes    Comments:      Nexplanon removed with ease- patient tolerated well- pressure dressing placed  Reviewed no heavy lifting and warning signs for infection hematoma- to call with any concerns  A/P:  22 yo G0 with nexplanon- desiring removal secondary to AUB- removed today without incident  Reviewed alternate contraceptive options including OCPs, POPs, patch, ring, Depo provera and IUD- patient most interested in IUD- reviewed different IUDs and is interested in [facility] IUD  Reviewed discomfort with placement, bleeding, infection, risk of expulsion, risk of uterine perforation and migration of device, abnormal bleeding pattern including intermenstrual spotting or amenorrhea  Reviewed condoms, abstinence or Plan B if needed in the meantime  Will task office for prior authorization

## 2022-12-05 ENCOUNTER — TELEPHONE (OUTPATIENT)
Dept: OBGYN CLINIC | Facility: CLINIC | Age: 22
End: 2022-12-05

## 2022-12-05 NOTE — TELEPHONE ENCOUNTER
Patient returned your call is scheduled for [facility] insertion with [place] iLbia [place] [facility] [place]

## 2023-02-09 ENCOUNTER — PROCEDURE VISIT (OUTPATIENT)
Dept: OBGYN CLINIC | Facility: CLINIC | Age: 23
End: 2023-02-09

## 2023-02-09 VITALS
SYSTOLIC BLOOD PRESSURE: 122 MMHG | DIASTOLIC BLOOD PRESSURE: 80 MMHG | WEIGHT: 163 LBS | BODY MASS INDEX: 30 KG/M2 | HEIGHT: 62 IN

## 2023-02-09 DIAGNOSIS — Z30.430 ENCOUNTER FOR INSERTION OF INTRAUTERINE CONTRACEPTIVE DEVICE (IUD): Primary | ICD-10-CM

## 2023-02-09 LAB — SL AMB POCT URINE HCG: NEGATIVE

## 2023-02-09 NOTE — PROGRESS NOTES
[facility]  Onesimo Rash insertion  Lala Cortes MD  02/09/23    Viki Roberto is a 19 yo G0 presenting for Onesimo Rash insertion- she has been counseled on contraception in the past and desires Mirena IUD as her form of contraception  She has been using condoms for contraception following Nexplanon removal 12/2022- UPT in office today is negative  We reviewed insertion and how the Onesimo Rash works and that she is covered for contraception for the next 5 years  We reviewed risks of placement including bleeding, pain, infection, uterine perforation, migration of device, expulsion, pregnancy and ectopic pregnancy  She had an opportunity to ask questions which were answered to the best of my ability and she agreed to proceed with Onesimo Rash insertion  /80 (BP Location: Left arm, Patient Position: Sitting, Cuff Size: Standard)   Ht 5' 2" (1 575 m)   Wt 73 9 kg (163 lb)   LMP 02/02/2023 (Approximate)   BMI 29 81 kg/m²     Physical Exam  Vitals reviewed  Constitutional:       General: She is not in acute distress  Appearance: She is not ill-appearing or diaphoretic  Cardiovascular:      Rate and Rhythm: Normal rate  Pulmonary:      Effort: Pulmonary effort is normal  No respiratory distress  Genitourinary:     Comments: Vulva normal without lesions  Urethra midline, skenes and bartholin glands normal   On speculum exam the vagina appeared normal without lesions, cervix nulliparous without lesions, minimal thin mucoid discharge seen  Uterus sounded to 7 cm  Musculoskeletal:      Right lower leg: No edema  Left lower leg: No edema  Skin:     General: Skin is warm and dry  Neurological:      Mental Status: She is alert and oriented to person, place, and time     Psychiatric:         Mood and Affect: Mood normal          Behavior: Behavior normal        Iud insertions    Date/Time: 2/9/2023 3:12 PM  Performed by: Lala Cortes MD  Authorized by: Lala Cortes MD     Other Assisting Provider: Yes (comment) KASIA Lucas    Verbal consent obtained?: Yes    Risks and benefits: Risks, benefits and alternatives were discussed    Consent given by:  Patient  Procedure:     Pelvic exam performed: yes      Negative urine pregnancy test: yes      Cervix cleaned and prepped: yes      Speculum placed in vagina: yes      Allis applied to cervix: yes      Uterus sounded: yes      Uterus sound depth (cm):  7    IUD inserted with no complications: yes      IUD type:  Lidia Don    Strings trimmed: yes    Post-procedure:     Patient tolerated procedure well: yes      Patient will follow up after next period: yes      RTO for annual exam and call with any concerns

## 2023-02-09 NOTE — PATIENT INSTRUCTIONS
Intrauterine Device   DISCHARGE INSTRUCTIONS:   Seek care immediately if:   You have severe pain or bleeding during your period  You have a fever and severe abdominal pain  Call your doctor or gynecologist if:   You think you are pregnant  The IUD has come out  You have bleeding from your vagina after you have sex, and it is not your period  You have pain during sex  You cannot feel the IUD string, the string feels longer, or you feel the plastic of the IUD itself  You have vaginal discharge that is green, yellow, or has a foul odor  You have questions or concerns about your condition or care  Medicines:   NSAIDs  help decrease swelling and pain or fever  This medicine is available with or without a doctor's order  NSAIDs can cause stomach bleeding or kidney problems in certain people  If you take blood thinner medicine, always ask your healthcare provider if NSAIDs are safe for you  Always read the medicine label and follow directions  Take your medicine as directed  Contact your healthcare provider if you think your medicine is not helping or if you have side effects  Tell him or her if you are allergic to any medicine  Keep a list of the medicines, vitamins, and herbs you take  Include the amounts, and when and why you take them  Bring the list or the pill bottles to follow-up visits  Carry your medicine list with you in case of an emergency  Self-care:   Apply heat to relieve pain and cramping  Use a heating pad set on low  Apply heat to your lower abdomen for 20 minutes every hour, or as directed  Return to activities as directed  Your healthcare provider will tell you when it is okay to return to work, school, or other activities  Do not use a tampon or have sex  until your provider says it is okay  Make sure your IUD is in place: An IUD has a string that is made of plastic thread  One to 2 inches of this string hangs into your vagina   You cannot see this string, and it should not cause problems when you have sex  Check your IUD string every 3 days for the first 3 months that you have your IUD  After that, check the string after each monthly period  Do the following to check the placement of your IUD:  Wash your hands with soap and warm water  Dry them with a clean towel  Bend your knees and squat low to the ground  Gently put your index finger inside your vagina  The cervix is at the top of the vagina and feels like the tip of your nose  Feel for the IUD string  Do not pull on the string  You should not be able to feel the firm plastic of the IUD itself  Wash your hands after you check your IUD string  For more information:   [facility] [address] , [address]  Phone: 7- 952 - 489-2692  Web Address: https://[employer]  org    Follow up with your doctor as directed:  Write down your questions so you remember to ask them during your visits  © Copyright [employer] 2022 Information is for End User's use only and may not be sold, redistributed or otherwise used for commercial purposes  All illustrations and images included in CareNotes® are the copyrighted property of A D A M , Inc  or [facility]   The above information is an  only  It is not intended as medical advice for individual conditions or treatments  Talk to your doctor, nurse or pharmacist before following any medical regimen to see if it is safe and effective for you

## 2023-05-03 ENCOUNTER — HOSPITAL ENCOUNTER (EMERGENCY)
Facility: HOSPITAL | Age: 23
Discharge: HOME | End: 2023-05-03
Attending: EMERGENCY MEDICINE

## 2023-05-03 ENCOUNTER — APPOINTMENT (EMERGENCY)
Dept: RADIOLOGY | Facility: HOSPITAL | Age: 23
End: 2023-05-03

## 2023-05-03 VITALS
DIASTOLIC BLOOD PRESSURE: 86 MMHG | OXYGEN SATURATION: 95 % | RESPIRATION RATE: 18 BRPM | HEART RATE: 86 BPM | SYSTOLIC BLOOD PRESSURE: 135 MMHG
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
## 2023-05-03 DIAGNOSIS — S16.1XXA STRAIN OF NECK MUSCLE, INITIAL ENCOUNTER: Primary | ICD-10-CM

## 2023-05-03 RX ORDER — ACETAMINOPHEN 325 MG/1
650 TABLET ORAL ONCE
Status: COMPLETED | OUTPATIENT
Start: 2023-05-03 | End: 2023-05-03

## 2023-05-03 RX ADMIN — ACETAMINOPHEN 650 MG: 325 TABLET ORAL at 19:41
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
## 2023-05-03 NOTE — ED NOTES
Provider at bedside to update and see pt  D/C summary with work notice expressed and provided via ER Provider       Alyce Sigala RN  05/03/23 5813

## 2023-11-21 ENCOUNTER — OFFICE VISIT (OUTPATIENT)
Dept: FAMILY MEDICINE CLINIC | Facility: CLINIC | Age: 23
End: 2023-11-21
Payer: COMMERCIAL

## 2023-11-21 VITALS
RESPIRATION RATE: 16 BRPM | SYSTOLIC BLOOD PRESSURE: 119 MMHG | HEART RATE: 95 BPM | BODY MASS INDEX: 30.93 KG/M2 | WEIGHT: 163.8 LBS | OXYGEN SATURATION: 98 % | HEIGHT: 61 IN | TEMPERATURE: 98 F | DIASTOLIC BLOOD PRESSURE: 59 MMHG

## 2023-11-21 DIAGNOSIS — G56.03 BILATERAL CARPAL TUNNEL SYNDROME: Primary | ICD-10-CM

## 2023-11-21 DIAGNOSIS — E66.9 OBESITY, CLASS I, BMI 30.0-34.9 (SEE ACTUAL BMI): ICD-10-CM

## 2023-11-21 PROCEDURE — 99214 OFFICE O/P EST MOD 30 MIN: CPT | Performed by: STUDENT IN AN ORGANIZED HEALTH CARE EDUCATION/TRAINING PROGRAM

## 2023-11-21 RX ORDER — GABAPENTIN 300 MG/1
300 CAPSULE ORAL 2 TIMES DAILY
COMMUNITY
Start: 2023-10-23

## 2023-11-21 NOTE — ASSESSMENT & PLAN NOTE
- Patient educated on the importance of weight loss and benefits of portion control and dieting.  - Patient also educated on the importance of exercise. Encouraged to engage in moderate intensity exercise for 30-50 min at least 3-5 times per week.    - Will consider referral to weight management in future

## 2023-11-21 NOTE — PATIENT INSTRUCTIONS
Carpal Tunnel Syndrome   WHAT YOU NEED TO KNOW:   What is carpal tunnel syndrome (CTS)? CTS is a condition that causes pressure to build in the carpal tunnel. The carpal tunnel is a small area between bones and tissues in your wrist. Swelling in this area puts pressure on the median nerve. The median nerve controls muscles and feeling in the hand. What increases my risk for CTS? CTS is more common in women than in men. Any of the following may also increase the risk for CTS:  Older age    A family history of CTS    Activities that use forceful or repetitive movement of your wrist and hand    A past or current wrist injury    Pregnancy or obesity that puts pressure on the area from swelling    A health condition, such as diabetes, arthritis, or hypothyroidism    What are the signs and symptoms of CTS? Dull, sharp, or shooting pain in your hand    Numb, tingling, or burning feeling in your thumb and first, middle, and ring fingers    Arm pain or tingling that may move up your arm toward your shoulder    Weakness in your hand    Swelling in your hand    Not being able to control how your hand moves, or not being able to use your fingers easily    How is CTS diagnosed? Your healthcare provider will examine your hand and arm. He or she will ask how long you have had symptoms and what makes them worse. Tell your provider if you have a family history of CTS. You may also need any of the following:  Tests  may be done to check for pressure on your nerve or to test how well your nerves are working. Your healthcare provider will tap, squeeze, press on, and gently move your wrist in different ways. X-ray, ultrasound, or MRI  pictures may be used to look at the bones in your wrist and hand. You may be given contrast liquid to help your wrist show up better in the pictures. Tell the healthcare provider if you have ever had an allergic reaction to contrast liquid.  Do not enter the MRI room with anything metal. Metal can cause serious injury. Tell healthcare providers if you have any metal in or on your body. How is CTS treated? Your symptoms may get better without treatment. You may need any of the following if your symptoms continue or are severe:  NSAIDs  help decrease swelling and pain or fever. This medicine is available with or without a doctor's order. NSAIDs can cause stomach bleeding or kidney problems in certain people. If you take blood thinner medicine, always ask your healthcare provider if NSAIDs are safe for you. Always read the medicine label and follow directions. Steroid injections  may help decrease pain and swelling. Steroids are injected into the carpal tunnel. Transcutaneous electric nerve stimulation (TENS)  uses mild electrical impulses to help decrease your wrist pain. Surgery  called decompression may be used to take pressure off of the median nerve in your wrist.    How can I manage my symptoms? Apply ice to your wrist.  Ice helps decrease swelling and pain in your wrist. Ice may also help prevent tissue damage. Use an ice pack, or put crushed ice in a plastic bag. Cover the ice pack with a towel. Place it on your wrist for 15 to 20 minutes every hour, or as directed. Rest your hands. Let your hands rest for a short time between repetitive motions, such as typing. If you feel pain, stop what you are doing and gently massage your wrist and hand. Go to physical and occupational therapy, if directed. Physical therapists will show you ways to exercise and strengthen your wrist. Occupational therapists will show you safe ways to use your wrist while you do your usual activities. Use a wrist splint as directed. A splint will keep your wrist straight or in a slightly bent position. A wrist splint decreases pressure on the median nerve by letting your wrist rest. You may need to wear the splint for up to 8 weeks. You may need to wear it at night. When should I seek immediate care? You suddenly lose feeling in your hand or fingers and you cannot move them. Your hand suddenly changes color. When should I call my doctor? Your symptoms get worse. Your hand and fingers are so weak that you cannot grab, squeeze, or lift items. You have questions or concerns about your condition or care. CARE AGREEMENT:   You have the right to help plan your care. Learn about your health condition and how it may be treated. Discuss treatment options with your healthcare providers to decide what care you want to receive. You always have the right to refuse treatment. The above information is an  only. It is not intended as medical advice for individual conditions or treatments. Talk to your doctor, nurse or pharmacist before following any medical regimen to see if it is safe and effective for you. © Copyright Loletta Gosselin 2023 Information is for End User's use only and may not be sold, redistributed or otherwise used for commercial purposes. Carpal Tunnel Syndrome Exercises   AMBULATORY CARE:   What you need to know about carpal tunnel syndrome (CTS) exercises:  CTS exercises can help relieve pain and increase your range of motion. Your healthcare provider or physical therapist can tell you how often to do the exercises. How to do CTS exercises:   Finger extensions:  Hold your fingers and thumb close together. Keep them straight. Put a rubber band around the outside of your fingers and thumb. Spread your fingers apart. Then slowly bring them together without letting the rubber band fall off. Repeat 40 times. Wrist flexor stretch:  Hold your arm out straight. Grasp your fingers with your other hand. Slowly bend the fingers back (palm facing away) until you feel a stretch in your wrist. Hold for 10 seconds. Repeat 5 times. Wrist extensor stretch:  Hold your arm out straight. Grasp your fingers with your other hand.  Slowly bend the fingers and hand down (palm facing you) until you feel a stretch on top of your hand. Hold for 10 seconds. Repeat 5 times. Wrist curls without weights:  Sit in a chair with your forearm resting on your thigh or a table. With your palm facing down, flex your wrist up 3 inches and slowly lower it. Turn your forearm over and repeat with your palm facing up. Do each exercise 20 times. Shrugs:  Stand with your arms by your side. Lift your shoulders up to your ears and hold for 1 second. Then pull your shoulders back, pinching your shoulder blades together. Hold for 1 second. Then relax your shoulders. Repeat 20 times. Follow up with your doctor or physical therapist as directed:  Write down your questions so you remember to ask them during your visits. © Copyright Dennis Bolanos 2023 Information is for End User's use only and may not be sold, redistributed or otherwise used for commercial purposes. The above information is an  only. It is not intended as medical advice for individual conditions or treatments. Talk to your doctor, nurse or pharmacist before following any medical regimen to see if it is safe and effective for you.

## 2023-11-21 NOTE — ASSESSMENT & PLAN NOTE
More pronounced on dominant hand (right)  Positive Phalen's test  Advised use of night splint for 6-8 weeks  Advised NSAIDs, ice, home PT - additional information provided in AVS

## 2023-11-21 NOTE — PROGRESS NOTES
Name: Lissa Cruz      : 2000      MRN: [id number]  Encounter Provider: Husam Erwin MD  Encounter Date: 2023   Encounter department: [facility]     1. Bilateral carpal tunnel syndrome  Assessment & Plan:  More pronounced on dominant hand (right)  Positive Phalen's test  Advised use of night splint for 6-8 weeks  Advised NSAIDs, ice, home PT - additional information provided in AVS      2. Obesity, Class I, BMI 30.0-34.9 (see actual BMI)  Assessment & Plan:  - Patient educated on the importance of weight loss and benefits of portion control and dieting.  - Patient also educated on the importance of exercise. Encouraged to engage in moderate intensity exercise for 30-50 min at least 3-5 times per week. - Will consider referral to weight management in future    Orders:  -     Lipid Panel with Direct LDL reflex; Future  -     Hemoglobin A1C; Future  -     TSH, 3rd generation with Free T4 reflex; Future  -     Comprehensive metabolic panel; Future        Depression Screening and Follow-up Plan: Patient was screened for depression during today's encounter. They screened negative with a PHQ-2 score of 0. Subjective     Patient presents to establish care. PMH: anxiety, OCD, childhood asthma, syncopal episodes in college which have now resolved  Medications: Effexor, Hydroxyzine, Gabapentin  Allergies: NKA  Surg Hx: none  Social Hx: occasional alcohol. Denies illicit drug use, marijuana use, and tobacco.   Fam Hx: Mother with pacemaker and DM, Father DM, maternal and paternal grandparents with DM    Current Concerns: intermittent wrist pain - attributes to work as a  (works on computer throughout the day) and martial arts practice. Symptoms occur every other day, feels in carpal tunnel region, pain lasts several hours. Pain exacerbated by prolonged use. Review of Systems   Constitutional:  Negative for chills and fever.    HENT: Negative for congestion, ear pain, rhinorrhea and sinus pain. Eyes:  Negative for visual disturbance. Respiratory:  Negative for chest tightness, shortness of breath and wheezing. Cardiovascular:  Negative for chest pain and palpitations. Gastrointestinal:  Negative for abdominal pain, constipation, diarrhea and vomiting. Endocrine: Negative for polyuria. Genitourinary:  Negative for dysuria. Musculoskeletal:  Negative for arthralgias and myalgias. Neurological:  Negative for dizziness, syncope and light-headedness. Past Medical History:   Diagnosis Date    Anxiety     OCD (obsessive compulsive disorder)     Psychiatric disorder      History reviewed. No pertinent surgical history.   Family History   Problem Relation Age of Onset    Parkinsonism Mother     Diabetes Father     Cancer Father     Cancer Maternal Grandmother     Diabetes Maternal Grandmother     Cancer Maternal Grandfather     Diabetes Maternal Grandfather     Diabetes Paternal Grandmother     Diabetes Paternal Grandfather      Social History     Socioeconomic History    Marital status: Single     Spouse name: None    Number of children: None    Years of education: None    Highest education level: None   Occupational History    None   Tobacco Use    Smoking status: Never    Smokeless tobacco: Never   Vaping Use    Vaping Use: Never used   Substance and Sexual Activity    Alcohol use: Yes     Comment: social    Drug use: No    Sexual activity: Yes     Partners: Male   Other Topics Concern    None   Social History Narrative    None     Social Determinants of Health     Financial Resource Strain: Not on file   Food Insecurity: Not on file   Transportation Needs: Not on file   Physical Activity: Not on file   Stress: Not on file   Social Connections: Not on file   Intimate Partner Violence: Not on file   Housing Stability: Not on file     Current Outpatient Medications on File Prior to Visit   Medication Sig    gabapentin (NEURONTIN) 300 mg capsule Take 300 mg by mouth 2 (two) times a day    hydrOXYzine pamoate (VISTARIL) 25 mg capsule TAKE ONE (1) CAPSULE BY MOUTH ONCE A DAY, AS NEEDED    levonorgestrel (KYLEENA) 19.5 MG intrauterine device 1 each by Intrauterine route once    venlafaxine (EFFEXOR-XR) 150 mg 24 hr capsule Take 300 mg by mouth daily    [DISCONTINUED] gabapentin (NEURONTIN) 100 mg capsule Take 200 mg by mouth 2 (two) times a day     No Known Allergies  Immunization History   Administered Date(s) Administered    COVID-19 PFIZER VACCINE 0.3 ML IM 12/11/2021, 01/03/2022       Objective     /59   Pulse 95   Temp 98 °F (36.7 °C)   Resp 16   Ht 5' 1.22" (1.555 m)   Wt 74.3 kg (163 lb 12.8 oz)   SpO2 98%   BMI 30.73 kg/m²     Physical Exam  Constitutional:       Appearance: Normal appearance. HENT:      Head: Normocephalic and atraumatic. Nose: Nose normal.   Eyes:      Conjunctiva/sclera: Conjunctivae normal.   Cardiovascular:      Rate and Rhythm: Normal rate. Pulmonary:      Effort: Pulmonary effort is normal.   Musculoskeletal:         General: Normal range of motion. Cervical back: Normal range of motion. Skin:     General: Skin is warm and dry. Neurological:      Mental Status: She is alert and oriented to person, place, and time.    Psychiatric:         Behavior: Behavior normal.       Alejandro Weaevr MD

## 2024-07-23 ENCOUNTER — HOSPITAL ENCOUNTER (EMERGENCY)
Facility: HOSPITAL | Age: 24
Discharge: HOME | End: 2024-07-23
Attending: EMERGENCY MEDICINE
Payer: COMMERCIAL

## 2024-07-23 VITALS
SYSTOLIC BLOOD PRESSURE: 134 MMHG | OXYGEN SATURATION: 99 % | TEMPERATURE: 97.9 F | HEART RATE: 78 BPM | RESPIRATION RATE: 18 BRPM | DIASTOLIC BLOOD PRESSURE: 84 MMHG

## 2024-07-23 DIAGNOSIS — R55 SYNCOPE: Primary | ICD-10-CM

## 2024-07-23 LAB
ALBUMIN SERPL BCG-MCNC: 4.3 G/DL (ref 3.5–5)
ALP SERPL-CCNC: 50 U/L (ref 34–104)
ALT SERPL W P-5'-P-CCNC: 32 U/L (ref 7–52)
ANION GAP SERPL CALCULATED.3IONS-SCNC: 6 MMOL/L (ref 4–13)
AST SERPL W P-5'-P-CCNC: 18 U/L (ref 13–39)
ATRIAL RATE: 78 BPM
BASOPHILS # BLD AUTO: 0.05 THOUSANDS/ÂΜL (ref 0–0.1)
BASOPHILS NFR BLD AUTO: 1 % (ref 0–1)
BILIRUB SERPL-MCNC: 0.45 MG/DL (ref 0.2–1)
BILIRUB UR QL STRIP: NEGATIVE
BUN SERPL-MCNC: 16 MG/DL (ref 5–25)
CALCIUM SERPL-MCNC: 9.1 MG/DL (ref 8.4–10.2)
CHLORIDE SERPL-SCNC: 106 MMOL/L (ref 96–108)
CLARITY UR: CLEAR
CO2 SERPL-SCNC: 28 MMOL/L (ref 21–32)
COLOR UR: COLORLESS
CREAT SERPL-MCNC: 0.68 MG/DL (ref 0.6–1.3)
EOSINOPHIL # BLD AUTO: 0.14 THOUSAND/ÂΜL (ref 0–0.61)
EOSINOPHIL NFR BLD AUTO: 2 % (ref 0–6)
ERYTHROCYTE [DISTWIDTH] IN BLOOD BY AUTOMATED COUNT: 11.6 % (ref 11.6–15.1)
EXT PREGNANCY TEST URINE: NEGATIVE
EXT. CONTROL: NORMAL
GFR SERPL CREATININE-BSD FRML MDRD: 121 ML/MIN/1.73SQ M
GLUCOSE SERPL-MCNC: 93 MG/DL (ref 65–140)
GLUCOSE UR STRIP-MCNC: NEGATIVE MG/DL
HCT VFR BLD AUTO: 38.6 % (ref 34.8–46.1)
HGB BLD-MCNC: 13.1 G/DL (ref 11.5–15.4)
HGB UR QL STRIP.AUTO: NEGATIVE
IMM GRANULOCYTES # BLD AUTO: 0.03 THOUSAND/UL (ref 0–0.2)
IMM GRANULOCYTES NFR BLD AUTO: 1 % (ref 0–2)
KETONES UR STRIP-MCNC: NEGATIVE MG/DL
LEUKOCYTE ESTERASE UR QL STRIP: NEGATIVE
LYMPHOCYTES # BLD AUTO: 2.6 THOUSANDS/ÂΜL (ref 0.6–4.47)
LYMPHOCYTES NFR BLD AUTO: 41 % (ref 14–44)
MCH RBC QN AUTO: 33 PG (ref 26.8–34.3)
MCHC RBC AUTO-ENTMCNC: 33.9 G/DL (ref 31.4–37.4)
MCV RBC AUTO: 97 FL (ref 82–98)
MONOCYTES # BLD AUTO: 0.42 THOUSAND/ÂΜL (ref 0.17–1.22)
MONOCYTES NFR BLD AUTO: 7 % (ref 4–12)
NEUTROPHILS # BLD AUTO: 3.08 THOUSANDS/ÂΜL (ref 1.85–7.62)
NEUTS SEG NFR BLD AUTO: 48 % (ref 43–75)
NITRITE UR QL STRIP: NEGATIVE
NRBC BLD AUTO-RTO: 0 /100 WBCS
P AXIS: 50 DEGREES
PH UR STRIP.AUTO: 6.5 [PH]
PLATELET # BLD AUTO: 249 THOUSANDS/UL (ref 149–390)
PMV BLD AUTO: 9 FL (ref 8.9–12.7)
POTASSIUM SERPL-SCNC: 4.1 MMOL/L (ref 3.5–5.3)
PR INTERVAL: 172 MS
PROT SERPL-MCNC: 6.9 G/DL (ref 6.4–8.4)
PROT UR STRIP-MCNC: NEGATIVE MG/DL
QRS AXIS: 95 DEGREES
QRSD INTERVAL: 92 MS
QT INTERVAL: 386 MS
QTC INTERVAL: 440 MS
RBC # BLD AUTO: 3.97 MILLION/UL (ref 3.81–5.12)
SODIUM SERPL-SCNC: 140 MMOL/L (ref 135–147)
SP GR UR STRIP.AUTO: 1.01 (ref 1–1.03)
T WAVE AXIS: -13 DEGREES
UROBILINOGEN UR STRIP-ACNC: <2 MG/DL
VENTRICULAR RATE: 78 BPM
WBC # BLD AUTO: 6.32 THOUSAND/UL (ref 4.31–10.16)

## 2024-07-23 PROCEDURE — 96360 HYDRATION IV INFUSION INIT: CPT

## 2024-07-23 PROCEDURE — 80053 COMPREHEN METABOLIC PANEL: CPT | Performed by: EMERGENCY MEDICINE

## 2024-07-23 PROCEDURE — 93010 ELECTROCARDIOGRAM REPORT: CPT | Performed by: INTERNAL MEDICINE

## 2024-07-23 PROCEDURE — 99285 EMERGENCY DEPT VISIT HI MDM: CPT | Performed by: EMERGENCY MEDICINE

## 2024-07-23 PROCEDURE — 81003 URINALYSIS AUTO W/O SCOPE: CPT | Performed by: EMERGENCY MEDICINE

## 2024-07-23 PROCEDURE — 93005 ELECTROCARDIOGRAM TRACING: CPT

## 2024-07-23 PROCEDURE — 99284 EMERGENCY DEPT VISIT MOD MDM: CPT

## 2024-07-23 PROCEDURE — 81025 URINE PREGNANCY TEST: CPT | Performed by: EMERGENCY MEDICINE

## 2024-07-23 PROCEDURE — 85025 COMPLETE CBC W/AUTO DIFF WBC: CPT | Performed by: EMERGENCY MEDICINE

## 2024-07-23 PROCEDURE — 36415 COLL VENOUS BLD VENIPUNCTURE: CPT | Performed by: EMERGENCY MEDICINE

## 2024-07-23 RX ADMIN — SODIUM CHLORIDE 1000 ML: 0.9 INJECTION, SOLUTION INTRAVENOUS at 12:46

## 2024-07-23 NOTE — Clinical Note
Pita Barajas was seen and treated in our emergency department on 7/23/2024.                Diagnosis:     Pita  may return to work on return date.    She may return on this date: 07/25/2024         If you have any questions or concerns, please don't hesitate to call.      Yue Rose, DO    ______________________________           _______________          _______________  Hospital Representative                              Date                                Time

## 2024-07-23 NOTE — ED PROVIDER NOTES
History  Chief Complaint   Patient presents with    Syncope     Pt presents after syncopal episode at work. States this happened before a few year ago and was told she has vasovagal syncopal episodes. On arrival pt is G[employer] 15 but visibly impaired.      44-year-old female presents the emergency department after having a syncopal episode at work.  Patient states that she was sitting at her desk when she began to feel lightheaded and weak.  Patient had a brief episode of syncope that was witnessed by coworkers.  No seizure activity or incontinence noted.  No injury related to the syncopal episode.  Patient has had previous history of syncope.  She was told that she has vasovagal syncope.  Patient denies chest pain.  No shortness of breath.  No vomiting.  Patient did eat a normal breakfast.  No recent medication changes or trauma.  No history of seizure or hypoglycemia.      History provided by:  Patient and medical records   used: No    Syncope  Episode history:  Single  Most recent episode:  Today  Duration:  1 minute  Timing:  Unable to specify  Progression:  Resolved  Chronicity:  Recurrent  Context: sitting down    Context: not blood draw, not dehydration, not exertion and not medication change    Witnessed: yes    Relieved by:  Nothing  Worsened by:  Nothing  Ineffective treatments:  None tried  Associated symptoms: weakness    Associated symptoms: no difficulty breathing, no dizziness, no fever, no headaches, no malaise/fatigue, no nausea and no shortness of breath    Risk factors: no seizures        Prior to Admission Medications   Prescriptions Last Dose Informant Patient Reported? Taking?   gabapentin (NEURONTIN) 300 mg capsule   Yes No   Sig: Take 300 mg by mouth 2 (two) times a day   hydrOXYzine pamoate (VISTARIL) 25 mg capsule   Yes No   Sig: TAKE ONE (1) CAPSULE BY MOUTH ONCE A DAY, AS NEEDED   levonorgestrel (KYLEENA) 19.5 MG intrauterine device   Yes No   Si each by Intrauterine  route once   venlafaxine (EFFEXOR-XR) 150 mg 24 hr capsule   Yes No   Sig: Take 300 mg by mouth daily      Facility-Administered Medications: None       Past Medical History:   Diagnosis Date    Anxiety     OCD (obsessive compulsive disorder)     Psychiatric disorder        History reviewed. No pertinent surgical history.    Family History   Problem Relation Age of Onset    Parkinsonism Mother     Diabetes Father     Cancer Father     Cancer Maternal Grandmother     Diabetes Maternal Grandmother     Cancer Maternal Grandfather     Diabetes Maternal Grandfather     Diabetes Paternal Grandmother     Diabetes Paternal Grandfather      I have reviewed and agree with the history as documented.    E-Cigarette/Vaping    E-Cigarette Use Never User      E-Cigarette/Vaping Substances     Social History     Tobacco Use    Smoking status: Never    Smokeless tobacco: Never   Vaping Use    Vaping status: Never Used   Substance Use Topics    Alcohol use: Yes     Comment: social    Drug use: No       Review of Systems   Constitutional:  Negative for appetite change, fever, malaise/fatigue and unexpected weight change.   Respiratory:  Negative for shortness of breath.    Cardiovascular:  Positive for syncope.   Gastrointestinal:  Negative for abdominal pain and nausea.   Genitourinary:  Negative for dysuria and vaginal bleeding.   Musculoskeletal:  Negative for back pain.   Neurological:  Positive for syncope and weakness. Negative for dizziness and headaches.   Psychiatric/Behavioral:  The patient is not nervous/anxious.    All other systems reviewed and are negative.      Physical Exam  Physical Exam  Vitals reviewed.   Constitutional:       General: She is not in acute distress.     Appearance: She is well-developed. She is not ill-appearing or toxic-appearing.   HENT:      Head: Normocephalic.      Nose: Nose normal.      Mouth/Throat:      Pharynx: No oropharyngeal exudate.   Eyes:      General: No scleral icterus.      Conjunctiva/sclera: Conjunctivae normal.      Pupils: Pupils are equal, round, and reactive to light.   Cardiovascular:      Rate and Rhythm: Normal rate and regular rhythm.      Heart sounds: Normal heart sounds.   Pulmonary:      Effort: Pulmonary effort is normal. No respiratory distress.      Breath sounds: Normal breath sounds.   Abdominal:      General: Abdomen is flat. Bowel sounds are normal. There is no distension.      Palpations: Abdomen is soft.      Tenderness: There is no abdominal tenderness. There is no guarding or rebound.   Musculoskeletal:         General: No tenderness or deformity. Normal range of motion.      Cervical back: Normal range of motion and neck supple.   Lymphadenopathy:      Cervical: No cervical adenopathy.   Skin:     General: Skin is warm and dry.      Findings: No rash.   Neurological:      General: No focal deficit present.      Mental Status: She is alert and oriented to person, place, and time.      Cranial Nerves: No cranial nerve deficit.      Sensory: No sensory deficit.      Motor: No abnormal muscle tone.      Coordination: Coordination normal.      Gait: Gait normal.      Deep Tendon Reflexes: Reflexes are normal and symmetric.   Psychiatric:         Behavior: Behavior normal.         Thought Content: Thought content normal.         Judgment: Judgment normal.         Vital Signs  ED Triage Vitals [07/23/24 1208]   Temperature Pulse Respirations Blood Pressure SpO2   97.9 °F (36.6 °C) 78 18 134/84 99 %      Temp Source Heart Rate Source Patient Position - Orthostatic VS BP Location FiO2 (%)   Oral Monitor Lying Right arm --      Pain Score       --           Vitals:    07/23/24 1208   BP: 134/84   Pulse: 78   Patient Position - Orthostatic VS: Lying         Visual Acuity      ED Medications  Medications   sodium chloride 0.9 % bolus 1,000 mL (0 mL Intravenous Stopped 7/23/24 1354)       Diagnostic Studies  Results Reviewed       Procedure Component Value Units Date/Time     Comprehensive metabolic panel [[id number]] Collected: 07/23/24 1245    Lab Status: Final result Specimen: Blood from Arm, Right Updated: 07/23/24 1315     Sodium 140 mmol/L      Potassium 4.1 mmol/L      Chloride 106 mmol/L      CO2 28 mmol/L      ANION GAP 6 mmol/L      BUN 16 mg/dL      Creatinine 0.68 mg/dL      Glucose 93 mg/dL      Calcium 9.1 mg/dL      AST 18 U/L      ALT 32 U/L      Alkaline Phosphatase 50 U/L      Total Protein 6.9 g/dL      Albumin 4.3 g/dL      Total Bilirubin 0.45 mg/dL      eGFR 121 ml/min/1.73sq m     Narrative:      [employer] guidelines for Chronic Kidney Disease (CKD):     Stage 1 with normal or high GFR (GFR > 90 mL/min/1.73 square meters)    Stage 2 Mild CKD (GFR = 60-89 mL/min/1.73 square meters)    Stage 3A Moderate CKD (GFR = 45-59 mL/min/1.73 square meters)    Stage 3B Moderate CKD (GFR = 30-44 mL/min/1.73 square meters)    Stage 4 Severe CKD (GFR = 15-29 mL/min/1.73 square meters)    Stage 5 End Stage CKD (GFR <15 mL/min/1.73 square meters)  Note: GFR calculation is accurate only with a steady state creatinine    UA w Reflex to Microscopic w Reflex to Culture [[id number]] Collected: 07/23/24 1245    Lab Status: Final result Specimen: Urine, Clean Catch Updated: 07/23/24 1304     Color, UA Colorless     Clarity, UA Clear     Specific Gravity, UA 1.008     pH, UA 6.5     Leukocytes, UA Negative     Nitrite, UA Negative     Protein, UA Negative mg/dl      Glucose, UA Negative mg/dl      Ketones, UA Negative mg/dl      Urobilinogen, UA <2.0 mg/dl      Bilirubin, UA Negative     Occult Blood, UA Negative    CBC and differential [[id number]] Collected: 07/23/24 1245    Lab Status: Final result Specimen: Blood from Arm, Right Updated: 07/23/24 1258     WBC 6.32 Thousand/uL      RBC 3.97 Million/uL      Hemoglobin 13.1 g/dL      Hematocrit 38.6 %      MCV 97 fL      MCH 33.0 pg      MCHC 33.9 g/dL      RDW 11.6 %      MPV 9.0 fL      Platelets 249 Thousands/uL       nRBC 0 /100 WBCs      Segmented % 48 %      Immature Grans % 1 %      Lymphocytes % 41 %      Monocytes % 7 %      Eosinophils Relative 2 %      Basophils Relative 1 %      Absolute Neutrophils 3.08 Thousands/µL      Absolute Immature Grans 0.03 Thousand/uL      Absolute Lymphocytes 2.60 Thousands/µL      Absolute Monocytes 0.42 Thousand/µL      Eosinophils Absolute 0.14 Thousand/µL      Basophils Absolute 0.05 Thousands/µL     POCT pregnancy, urine [[id number]]  (Normal) Resulted: 07/23/24 1234    Lab Status: Final result Updated: 07/23/24 1234     EXT Preg Test, Ur Negative     Control Valid                   No orders to display              Procedures  ECG 12 Lead Documentation Only    Date/Time: 7/23/2024 12:31 PM    Performed by: Yue Rose DO  Authorized by: Yue Rose DO    Indications / Diagnosis:  Syncope  ECG reviewed by me, the ED Provider: yes    Patient location:  ED  Previous ECG:     Previous ECG:  Compared to current    Comparison ECG info:  2019    Similarity:  No change  Interpretation:     Interpretation: abnormal    Rate:     ECG rate:  78    ECG rate assessment: normal    Rhythm:     Rhythm: sinus rhythm    Ectopy:     Ectopy: none    QRS:     QRS axis:  Right  Conduction:     Conduction: normal    ST segments:     ST segments:  Normal  T waves:     T waves: inverted      Inverted:  III, aVF, V3 and V4           ED Course  ED Course as of 07/23/24 1355   Tue Jul 23, 2024   1345 Patient reevaluated by me.  She is resting comfortably.  Vital signs are stable.  Her blood work is unremarkable.  EKG is normal.  Patient feels well for discharge.                                               Medical Decision Making  24-year-old female presents after having a brief syncopal episode at work.  Differential diagnosis includes was not limited to syncope secondary to vasovagal syndrome, anemia, electrolyte abnormality, cardiac dysrhythmia, dehydration.    Problems Addressed:  Syncope: acute illness or  injury    Amount and/or Complexity of Data Reviewed  Labs: ordered.     Details: Labs ordered and independently interpreted by me, my interpretation is no acute abnormality.  ECG/medicine tests: ordered and independent interpretation performed. Decision-making details documented in ED Course.    Risk  Risk Details: 24-year-old female presents after having a brief syncopal episode.  Patient has had normal vital signs throughout her stay in the department.  Patient's EKG and blood work is unremarkable.  She is tolerating oral intake.  Recommend increasing fluid hydration and taking caution when changing positions to avoid recurrence of syncope.  Patient to follow-up with PCP for recheck.  Discussed signs and symptoms to return to the emergency department                 Disposition  Final diagnoses:   Syncope     Time reflects when diagnosis was documented in both MDM as applicable and the Disposition within this note       Time User Action Codes Description Comment    7/23/2024  1:46 PM Yue Rose Add [R55] Syncope           ED Disposition       ED Disposition   Discharge    Condition   Stable    Date/Time   Tue Jul 23, 2024  1:46 PM    Comment   Pita Barajas discharge to home/self care.                   Follow-up Information       Follow up With Specialties Details Why Contact Info    Yana Mackenzie MD Family Medicine Schedule an appointment as soon as possible for a visit in 2 days For recheck of current symptoms [address]  [address]  [facility] [address] [phone]              Patient's Medications   Discharge Prescriptions    No medications on file       No discharge procedures on file.    PDMP Review       None            ED Provider  Electronically Signed by             Yue Rose DO  07/23/24 1063

## 2024-07-24 ENCOUNTER — TELEPHONE (OUTPATIENT)
Dept: FAMILY MEDICINE CLINIC | Facility: CLINIC | Age: 24
End: 2024-07-24

## 2024-07-24 NOTE — TELEPHONE ENCOUNTER
07/24/24 3:34 PM    Patient contacted post ED visit, [facility] department spoke with patient/caregiver and outreach was successful.    Thank you.  Rika Dias [facility]